# Patient Record
Sex: FEMALE | Race: WHITE | NOT HISPANIC OR LATINO
[De-identification: names, ages, dates, MRNs, and addresses within clinical notes are randomized per-mention and may not be internally consistent; named-entity substitution may affect disease eponyms.]

---

## 2021-01-29 ENCOUNTER — TRANSCRIPTION ENCOUNTER (OUTPATIENT)
Age: 61
End: 2021-01-29

## 2021-09-05 ENCOUNTER — TRANSCRIPTION ENCOUNTER (OUTPATIENT)
Age: 61
End: 2021-09-05

## 2023-08-17 ENCOUNTER — APPOINTMENT (OUTPATIENT)
Dept: ORTHOPEDIC SURGERY | Facility: CLINIC | Age: 63
End: 2023-08-17

## 2023-08-30 ENCOUNTER — APPOINTMENT (OUTPATIENT)
Dept: ORTHOPEDIC SURGERY | Facility: CLINIC | Age: 63
End: 2023-08-30
Payer: COMMERCIAL

## 2023-08-30 VITALS — WEIGHT: 240 LBS | BODY MASS INDEX: 34.36 KG/M2 | HEIGHT: 70 IN

## 2023-08-30 DIAGNOSIS — S93.492A SPRAIN OF OTHER LIGAMENT OF LEFT ANKLE, INITIAL ENCOUNTER: ICD-10-CM

## 2023-08-30 DIAGNOSIS — I10 ESSENTIAL (PRIMARY) HYPERTENSION: ICD-10-CM

## 2023-08-30 DIAGNOSIS — M25.671 STIFFNESS OF RIGHT ANKLE, NOT ELSEWHERE CLASSIFIED: ICD-10-CM

## 2023-08-30 DIAGNOSIS — Z00.00 ENCOUNTER FOR GENERAL ADULT MEDICAL EXAMINATION W/OUT ABNORMAL FINDINGS: ICD-10-CM

## 2023-08-30 PROCEDURE — 73610 X-RAY EXAM OF ANKLE: CPT | Mod: 50

## 2023-08-30 PROCEDURE — 99214 OFFICE O/P EST MOD 30 MIN: CPT

## 2023-08-30 RX ORDER — OLMESARTAN MEDOXOMIL / AMLODIPINE BESYLATE / HYDROCHLOROTHIAZIDE 10; 25; 40 MG/1; MG/1; MG/1
TABLET, FILM COATED ORAL
Refills: 0 | Status: ACTIVE | COMMUNITY

## 2023-08-30 RX ORDER — NEBIVOLOL HYDROCHLORIDE 20 MG/1
TABLET ORAL
Refills: 0 | Status: ACTIVE | COMMUNITY

## 2023-08-30 RX ORDER — MELOXICAM 15 MG/1
15 TABLET ORAL DAILY
Qty: 30 | Refills: 1 | Status: COMPLETED | COMMUNITY
Start: 2023-08-30 | End: 1900-01-01

## 2023-08-30 NOTE — PHYSICAL EXAM
[Left] : left foot and ankle [NL 30)] : inversion 30 degrees [NL (20)] : eversion 20 degrees [Right] : right foot and ankle [NL (40)] : plantar flexion 40 degrees [5___] : eversion 5[unfilled]/5 [2+] : posterior tibialis pulse: 2+ [Normal] : saphenous nerve sensation normal [Bilateral] : ankle bilaterally [Weight -] : weightbearing [4___] : inversion 4[unfilled]/5 [] : non-antalgic [FreeTextEntry3] : + pump bump [FreeTextEntry9] : Bilateral plantar heel and achilles insertion spurs.  [de-identified] : inversion 15 degrees [de-identified] : eversion 15 degrees [TWNoteComboBox7] : dorsiflexion 15 degrees

## 2023-08-30 NOTE — HISTORY OF PRESENT ILLNESS
[7] : 7 [3] : 3 [Burning] : burning [Dull/Aching] : dull/aching [Sharp] : sharp [de-identified] : Pt is a 63 year old female who presents today for evaluation of both ankles. Reports twisting injury LT ankle late July 2023. Evaluated at [facility] and diagnosed with ankle sprain. History of LT ankle sprains, partial tear Achilles tendon. LT ankle sprain has improved. RT ankle, developed pain and posterior bump early 2023. Pain worse with increased activity. No formal treatment to date. Symptoms on the RT have been intermittent and manageable. WB in sandals.  [] : no [FreeTextEntry1] : B/L Ankle

## 2023-08-30 NOTE — ASSESSMENT
[FreeTextEntry1] : LT ankle: The patient was explained that they have an ankle sprain. Weight bearing in supportive footwear with a neoprene sleeve was recommended.  Icing for 20 minutes 2-3 times per day was instructed. Physical therapy was prescribed, and home range of motion exercises were encouraged.   The patient was explained the options as well as benefits of over the counter verses prescription strength nonsteroidal anti-inflammatory medication. The patient opts for a prescription strength medication.  RT ankle: WBAT in supportive footwear, heel lifts. Recommend a course of PT as well. Ice to affected area. Stretching exercises recommended and demonstrated to patient.

## 2023-09-28 ENCOUNTER — APPOINTMENT (OUTPATIENT)
Dept: ORTHOPEDIC SURGERY | Facility: CLINIC | Age: 63
End: 2023-09-28

## 2023-10-10 ENCOUNTER — APPOINTMENT (OUTPATIENT)
Dept: ORTHOPEDIC SURGERY | Facility: CLINIC | Age: 63
End: 2023-10-10
Payer: COMMERCIAL

## 2023-10-10 ENCOUNTER — TRANSCRIPTION ENCOUNTER (OUTPATIENT)
Age: 63
End: 2023-10-10

## 2023-10-10 VITALS — WEIGHT: 240 LBS | HEIGHT: 70 IN | BODY MASS INDEX: 34.36 KG/M2

## 2023-10-10 DIAGNOSIS — M25.552 PAIN IN LEFT HIP: ICD-10-CM

## 2023-10-10 PROCEDURE — 99214 OFFICE O/P EST MOD 30 MIN: CPT

## 2023-10-10 PROCEDURE — 73503 X-RAY EXAM HIP UNI 4/> VIEWS: CPT | Mod: LT

## 2023-10-10 RX ORDER — MELOXICAM 15 MG/1
15 TABLET ORAL DAILY
Qty: 30 | Refills: 1 | Status: ACTIVE | COMMUNITY
Start: 2023-10-10 | End: 1900-01-01

## 2023-11-22 ENCOUNTER — APPOINTMENT (OUTPATIENT)
Dept: ORTHOPEDIC SURGERY | Facility: CLINIC | Age: 63
End: 2023-11-22
Payer: COMMERCIAL

## 2023-11-22 PROCEDURE — 99214 OFFICE O/P EST MOD 30 MIN: CPT

## 2023-11-28 ENCOUNTER — APPOINTMENT (OUTPATIENT)
Dept: ORTHOPEDIC SURGERY | Facility: CLINIC | Age: 63
End: 2023-11-28

## 2023-11-29 ENCOUNTER — APPOINTMENT (OUTPATIENT)
Dept: MRI IMAGING | Facility: CLINIC | Age: 63
End: 2023-11-29
Payer: COMMERCIAL

## 2023-11-29 PROCEDURE — 73721 MRI JNT OF LWR EXTRE W/O DYE: CPT | Mod: RT

## 2023-11-29 PROCEDURE — 73721 MRI JNT OF LWR EXTRE W/O DYE: CPT | Mod: 1L,RT

## 2023-12-07 ENCOUNTER — APPOINTMENT (OUTPATIENT)
Dept: ORTHOPEDIC SURGERY | Facility: CLINIC | Age: 63
End: 2023-12-07
Payer: COMMERCIAL

## 2023-12-07 PROCEDURE — 99214 OFFICE O/P EST MOD 30 MIN: CPT

## 2023-12-13 ENCOUNTER — APPOINTMENT (OUTPATIENT)
Dept: ORTHOPEDIC SURGERY | Facility: CLINIC | Age: 63
End: 2023-12-13
Payer: COMMERCIAL

## 2023-12-13 PROCEDURE — 99024 POSTOP FOLLOW-UP VISIT: CPT

## 2023-12-13 PROCEDURE — 005KIT: CUSTOM

## 2023-12-13 NOTE — PHYSICAL EXAM
[Right] : right foot and ankle [NL (40)] : plantar flexion 40 degrees [NL 30)] : inversion 30 degrees [NL (20)] : eversion 20 degrees [5___] : eversion 5[unfilled]/5 [2+] : posterior tibialis pulse: 2+ [Normal] : saphenous nerve sensation normal [] : non-antalgic [FreeTextEntry3] : pump bump present

## 2023-12-13 NOTE — DISCUSSION/SUMMARY
[de-identified] : With patient consent, 50 cc blood drawn from upper extremity using sterile technique. Pt. tolerated this well. 3 cc of PRP obtained and injected into right achilles tendon insertion using ultrasound guidance to localize needle placement. Pt. tolerated this well. They are recommended to be weight bearing as tolerated in CAM boot (can wean from boot in 2 weeks). No ice or NSAIDS permitted at this time. If any issues, patient encouraged to call office. F/U in 4-6 weeks.

## 2023-12-13 NOTE — HISTORY OF PRESENT ILLNESS
[7] : 7 [3] : 3 [Burning] : burning [Dull/Aching] : dull/aching [Sharp] : sharp [de-identified] : Patient returns for both ankles. Reports twisting injury LT ankle late July 2023. Evaluated at [facility] and diagnosed with ankle sprain. History of  a right partial tear Achilles tendon whch she had a PRP injection 8 years ago and a recent toradol injection in October, 2023. She reports no issues with the left ankle, but the right achilles tendon still bothers her. Pain is mostly when she squats down and when navigating stairs.  Right ankle MRI showed some distal achilles tendinosis.  She completed a course of PT with no real improvement. She presents today for PRP injection.  [] : no [FreeTextEntry1] : B/L Ankle

## 2024-01-18 ENCOUNTER — APPOINTMENT (OUTPATIENT)
Dept: ORTHOPEDIC SURGERY | Facility: CLINIC | Age: 64
End: 2024-01-18
Payer: COMMERCIAL

## 2024-01-18 DIAGNOSIS — M76.61 ACHILLES TENDINITIS, RIGHT LEG: ICD-10-CM

## 2024-01-18 PROCEDURE — 99213 OFFICE O/P EST LOW 20 MIN: CPT

## 2024-01-18 NOTE — PHYSICAL EXAM
[Right] : right foot and ankle [NL (40)] : plantar flexion 40 degrees [NL (20)] : eversion 20 degrees [5___] : eversion 5[unfilled]/5 [2+] : posterior tibialis pulse: 2+ [Normal] : saphenous nerve sensation normal [] : non-antalgic [FreeTextEntry3] : pump bump present [de-identified] : inversion 20 degrees

## 2024-01-18 NOTE — DISCUSSION/SUMMARY
[de-identified] : Patient seems to be doing much better. She will continue with home stretching. She should avoid high impact activities.

## 2024-01-18 NOTE — HISTORY OF PRESENT ILLNESS
[7] : 7 [3] : 3 [Burning] : burning [Dull/Aching] : dull/aching [Sharp] : sharp [de-identified] : Patient returns for he right ankle.  She had a PRP injection on 12/13/24 and reports some improvement, but still has some pain. She reports no issues with the left ankle.  Prior right ankle MRI showed some distal achilles tendinosis.   [] : no [FreeTextEntry1] : B/L Ankle

## 2024-02-02 ENCOUNTER — APPOINTMENT (OUTPATIENT)
Dept: MRI IMAGING | Facility: CLINIC | Age: 64
End: 2024-02-02
Payer: COMMERCIAL

## 2024-02-02 PROCEDURE — 73721 MRI JNT OF LWR EXTRE W/O DYE: CPT | Mod: LT

## 2024-02-19 ENCOUNTER — APPOINTMENT (OUTPATIENT)
Dept: ORTHOPEDIC SURGERY | Facility: CLINIC | Age: 64
End: 2024-02-19
Payer: COMMERCIAL

## 2024-02-19 VITALS — HEIGHT: 70 IN | BODY MASS INDEX: 34.36 KG/M2 | WEIGHT: 240 LBS

## 2024-02-19 PROCEDURE — 76942 ECHO GUIDE FOR BIOPSY: CPT | Mod: 59,LT

## 2024-02-19 PROCEDURE — J3490M: CUSTOM

## 2024-02-19 PROCEDURE — 20551 NJX 1 TENDON ORIGIN/INSJ: CPT | Mod: LT

## 2024-02-19 PROCEDURE — 99214 OFFICE O/P EST MOD 30 MIN: CPT | Mod: 25

## 2024-02-19 NOTE — HISTORY OF PRESENT ILLNESS
[Sudden] : sudden [Constant] : constant [Leisure] : leisure [Meds] : meds [de-identified] : 2/19/24: Here to review LT hip MRI.  Doing PT with sig benefit but still having constant lateral pain that radiates to groin. Not taking meds for pain. No cane.   8/05/23: LT hip pain x 2 months since accident while trying to get out of pool and slipped. Initially her ankle was causing the most pain- but now that it has resolved the hip is bothering her more. Most pain on lateral aspect and radiates to groin. Painful to sleep on left side, walking not so bad. Pain with rotation. Was taking Meloxicam for the ankle but states it was helping the hip. Ambulates without assitance.  [] : no [FreeTextEntry1] : Lt Hip  [FreeTextEntry5] : Patient is here for LT hip pain. Patient slipped on 08/05/23 trying to get out the pool. [place] her leg twist. Shahzad's n/t. [FreeTextEntry9] : Meloxciam

## 2024-02-19 NOTE — ASSESSMENT
[FreeTextEntry1] : 10/10/23: Pt with traumatic bursitis of LT hip. Discussed conservative tx options- risks and benefits explained. Pt is well informed and would like to proceed with PT (in addition to her ankle PT)  and start on Meloxicam. 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 Some improvement with PT but still having some sig lateral and groin pain. She is well informed and would like to proceed with LT hip injection today. If no benefit discussed possibility of IA injection. Follow up 1 month

## 2024-02-19 NOTE — DISCUSSION/SUMMARY
[de-identified] : The patient was advised of the diagnosis.  The natural history of the pathology was explained in full to the patient in layman's terms. All questions were answered.  The risks and benefits of surgical and non-surgical treatment alternatives were explained in full to the patient.  Entered by Sissy Chen acting as a scribe.

## 2024-02-19 NOTE — DATA REVIEWED
[MRI] : MRI [Left] : left [Hip] : hip [I reviewed the films/CD] : I reviewed the films/CD [FreeTextEntry1] : MRI LT HIP showing moderate to severe tendinopathy and some partial tearing of glute medius with mild OA

## 2024-02-19 NOTE — IMAGING
[de-identified] : LEFT HIP IR and ER gives lateral pain no groin pain (-) impingement no pain with resisted SLR pain with resisted abduction greater troch tenderness 5/5 strength NVI +2 pt pulses (-) Trendelenburg  LSPINE no lumbar pain  XR LT HIP showing calcification at bursa on lateral troch- otherwise normal.

## 2024-03-18 ENCOUNTER — APPOINTMENT (OUTPATIENT)
Dept: ORTHOPEDIC SURGERY | Facility: CLINIC | Age: 64
End: 2024-03-18
Payer: COMMERCIAL

## 2024-03-18 VITALS — HEIGHT: 70 IN | WEIGHT: 240 LBS | BODY MASS INDEX: 34.36 KG/M2

## 2024-03-18 DIAGNOSIS — M16.12 UNILATERAL PRIMARY OSTEOARTHRITIS, LEFT HIP: ICD-10-CM

## 2024-03-18 DIAGNOSIS — S76.202A UNSPECIFIED INJURY OF ADDUCTOR MUSCLE, FASCIA AND TENDON OF LEFT THIGH, INITIAL ENCOUNTER: ICD-10-CM

## 2024-03-18 DIAGNOSIS — M70.62 TROCHANTERIC BURSITIS, LEFT HIP: ICD-10-CM

## 2024-03-18 PROCEDURE — 99212 OFFICE O/P EST SF 10 MIN: CPT

## 2024-03-18 NOTE — HISTORY OF PRESENT ILLNESS
[0] : 0 [Rest] : rest [de-identified] : 3/18/24: Pt with sig damage to abductor with partial tearing and mild OA. Feels about 80% relief with injection- still some difficulty putting on socks and shoes but otherwise min to no pain. Has not done PT yet- states she has been in [place]. Has not been taking meds for pain since inj  8/05/23: LT hip pain x 2 months since accident while trying to get out of pool and slipped. Initially her ankle was causing the most pain- but now that it has resolved the hip is bothering her more. Most pain on lateral aspect and radiates to groin. Painful to sleep on left side, walking not so bad. Pain with rotation. Was taking Meloxicam for the ankle but states it was helping the hip. Ambulates without assitance.  2/19/24: Here to review LT hip MRI.  Doing PT with sig benefit but still having constant lateral pain that radiates to groin. Not taking meds for pain. No cane.  [] : no [FreeTextEntry1] : left hip  [FreeTextEntry5] : was given csi 02/19/24

## 2024-03-18 NOTE — DISCUSSION/SUMMARY
[de-identified] : The patient was advised of the diagnosis.  The natural history of the pathology was explained in full to the patient in layman's terms. All questions were answered.  The risks and benefits of surgical and non-surgical treatment alternatives were explained in full to the patient.  Entered by Sissy Chen acting as a scribe.

## 2024-03-18 NOTE — ASSESSMENT
[FreeTextEntry1] : 10/10/23: Pt with traumatic bursitis of LT hip. Discussed conservative tx options- risks and benefits explained. Pt is well informed and would like to proceed with PT (in addition to her ankle PT)  and start on Meloxicam. 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 Some improvement with PT but still having some sig lateral and groin pain. She is well informed and would like to proceed with LT hip injection today. If no benefit discussed possibility of IA injection. Follow up 1 month  3/18/24: Sig improvement with bursa injection at last visit. Discussed we cannot inject this site repeatedly due to risk of tendon rupture- but she is feeling significantly better at this time. Unsure if she wants to do PT as she is also dealing with an ankle injury. She will speak to her PT and follow up with me as needed.

## 2024-03-18 NOTE — IMAGING
[de-identified] : LEFT HIP IR and ER gives lateral pain no groin pain (-) impingement no pain with resisted SLR pain with resisted abduction greater troch tenderness 5/5 strength NVI +2 pt pulses (-) Trendelenburg  LSPINE no lumbar pain  XR LT HIP showing calcification at bursa on lateral troch- otherwise normal.

## 2024-03-28 ENCOUNTER — APPOINTMENT (OUTPATIENT)
Dept: ORTHOPEDIC SURGERY | Facility: CLINIC | Age: 64
End: 2024-03-28

## 2024-05-31 ENCOUNTER — APPOINTMENT (OUTPATIENT)
Dept: OBGYN | Facility: CLINIC | Age: 64
End: 2024-05-31

## 2024-06-07 ENCOUNTER — APPOINTMENT (OUTPATIENT)
Dept: OPHTHALMOLOGY | Facility: CLINIC | Age: 64
End: 2024-06-07
Payer: COMMERCIAL

## 2024-06-07 ENCOUNTER — NON-APPOINTMENT (OUTPATIENT)
Age: 64
End: 2024-06-07

## 2024-06-07 PROCEDURE — 92133 CPTRZD OPH DX IMG PST SGM ON: CPT

## 2024-06-07 PROCEDURE — 92004 COMPRE OPH EXAM NEW PT 1/>: CPT

## 2024-07-09 ENCOUNTER — APPOINTMENT (OUTPATIENT)
Dept: OBGYN | Facility: CLINIC | Age: 64
End: 2024-07-09
Payer: COMMERCIAL

## 2024-07-09 VITALS
DIASTOLIC BLOOD PRESSURE: 89 MMHG | WEIGHT: 242.56 LBS | BODY MASS INDEX: 34.72 KG/M2 | HEIGHT: 70 IN | SYSTOLIC BLOOD PRESSURE: 170 MMHG

## 2024-07-09 DIAGNOSIS — Z01.419 ENCOUNTER FOR GYNECOLOGICAL EXAMINATION (GENERAL) (ROUTINE) W/OUT ABNORMAL FINDINGS: ICD-10-CM

## 2024-07-09 LAB
DATE COLLECTED: NORMAL
HEMOCCULT SP1 STL QL: NEGATIVE
QUALITY CONTROL: YES

## 2024-07-09 PROCEDURE — 82270 OCCULT BLOOD FECES: CPT

## 2024-07-09 PROCEDURE — 99387 INIT PM E/M NEW PAT 65+ YRS: CPT

## 2024-07-10 LAB — HPV HIGH+LOW RISK DNA PNL CVX: NOT DETECTED

## 2024-07-15 LAB — CYTOLOGY CVX/VAG DOC THIN PREP: NORMAL

## 2025-06-03 ENCOUNTER — NON-APPOINTMENT (OUTPATIENT)
Age: 65
End: 2025-06-03

## 2025-06-05 ENCOUNTER — NON-APPOINTMENT (OUTPATIENT)
Age: 65
End: 2025-06-05

## 2025-06-06 ENCOUNTER — APPOINTMENT (OUTPATIENT)
Dept: OPHTHALMOLOGY | Facility: CLINIC | Age: 65
End: 2025-06-06
Payer: COMMERCIAL

## 2025-06-06 ENCOUNTER — APPOINTMENT (OUTPATIENT)
Dept: OPHTHALMOLOGY | Facility: CLINIC | Age: 65
End: 2025-06-06

## 2025-06-06 ENCOUNTER — NON-APPOINTMENT (OUTPATIENT)
Age: 65
End: 2025-06-06

## 2025-06-06 PROCEDURE — 92014 COMPRE OPH EXAM EST PT 1/>: CPT

## 2025-06-06 PROCEDURE — 92015 DETERMINE REFRACTIVE STATE: CPT

## 2025-06-16 ENCOUNTER — NON-APPOINTMENT (OUTPATIENT)
Age: 65
End: 2025-06-16

## 2025-06-16 ENCOUNTER — APPOINTMENT (OUTPATIENT)
Dept: OPHTHALMOLOGY | Facility: CLINIC | Age: 65
End: 2025-06-16
Payer: COMMERCIAL

## 2025-06-16 PROCEDURE — 92136 OPHTHALMIC BIOMETRY: CPT

## 2025-06-16 PROCEDURE — 99213 OFFICE O/P EST LOW 20 MIN: CPT

## 2025-06-24 ENCOUNTER — APPOINTMENT (OUTPATIENT)
Dept: OPHTHALMOLOGY | Facility: AMBULATORY SURGERY CENTER | Age: 65
End: 2025-06-24
Payer: COMMERCIAL

## 2025-06-24 ENCOUNTER — NON-APPOINTMENT (OUTPATIENT)
Age: 65
End: 2025-06-24

## 2025-06-24 PROCEDURE — 66984 XCAPSL CTRC RMVL W/O ECP: CPT | Mod: RT

## 2025-06-24 PROCEDURE — CL050: CPT

## 2025-06-25 ENCOUNTER — APPOINTMENT (OUTPATIENT)
Dept: OPHTHALMOLOGY | Facility: CLINIC | Age: 65
End: 2025-06-25
Payer: COMMERCIAL

## 2025-06-25 ENCOUNTER — NON-APPOINTMENT (OUTPATIENT)
Age: 65
End: 2025-06-25

## 2025-06-25 PROCEDURE — 92136 OPHTHALMIC BIOMETRY: CPT | Mod: 26,LT

## 2025-06-25 PROCEDURE — 99213 OFFICE O/P EST LOW 20 MIN: CPT | Mod: 24

## 2025-07-01 ENCOUNTER — APPOINTMENT (OUTPATIENT)
Dept: OPHTHALMOLOGY | Facility: AMBULATORY SURGERY CENTER | Age: 65
End: 2025-07-01
Payer: COMMERCIAL

## 2025-07-01 ENCOUNTER — NON-APPOINTMENT (OUTPATIENT)
Age: 65
End: 2025-07-01

## 2025-07-01 PROCEDURE — 66984 XCAPSL CTRC RMVL W/O ECP: CPT | Mod: 79,LT

## 2025-07-01 PROCEDURE — CL050: CPT

## 2025-07-02 ENCOUNTER — NON-APPOINTMENT (OUTPATIENT)
Age: 65
End: 2025-07-02

## 2025-07-02 ENCOUNTER — APPOINTMENT (OUTPATIENT)
Dept: OPHTHALMOLOGY | Facility: CLINIC | Age: 65
End: 2025-07-02
Payer: COMMERCIAL

## 2025-07-02 PROCEDURE — 99024 POSTOP FOLLOW-UP VISIT: CPT

## 2025-07-08 ENCOUNTER — APPOINTMENT (OUTPATIENT)
Dept: OPHTHALMOLOGY | Facility: CLINIC | Age: 65
End: 2025-07-08
Payer: COMMERCIAL

## 2025-07-08 ENCOUNTER — NON-APPOINTMENT (OUTPATIENT)
Age: 65
End: 2025-07-08

## 2025-07-08 PROCEDURE — 99024 POSTOP FOLLOW-UP VISIT: CPT

## 2025-07-14 PROBLEM — Z12.39 BREAST CANCER SCREENING: Status: ACTIVE | Noted: 2025-07-14

## 2025-07-15 ENCOUNTER — APPOINTMENT (OUTPATIENT)
Dept: OBGYN | Facility: CLINIC | Age: 65
End: 2025-07-15
Payer: COMMERCIAL

## 2025-07-15 VITALS
SYSTOLIC BLOOD PRESSURE: 130 MMHG | BODY MASS INDEX: 35.65 KG/M2 | WEIGHT: 249 LBS | HEIGHT: 70 IN | DIASTOLIC BLOOD PRESSURE: 84 MMHG

## 2025-07-15 PROBLEM — Z01.419 ENCOUNTER FOR ANNUAL ROUTINE GYNECOLOGICAL EXAMINATION: Status: ACTIVE | Noted: 2025-07-15

## 2025-07-15 PROBLEM — L90.0 LICHEN SCLEROSUS: Status: ACTIVE | Noted: 2025-07-15

## 2025-07-15 PROBLEM — Z01.419 ENCOUNTER FOR ANNUAL ROUTINE GYNECOLOGICAL EXAMINATION: Status: RESOLVED | Noted: 2024-07-09 | Resolved: 2025-07-15

## 2025-07-15 PROBLEM — Z80.3 FAMILY HISTORY OF BREAST CANCER: Status: ACTIVE | Noted: 2025-07-15

## 2025-07-15 LAB
CARD LOT #: NORMAL
CARD LOT EXP DATE: NORMAL
DATE COLLECTED: NORMAL
DATE COLLECTED: NORMAL
DEVELOPER LOT #: NORMAL
DEVELOPER LOT EXP DATE: NORMAL
HEMOCCULT 2: NEGATIVE
HEMOCCULT SP1 STL QL: NEGATIVE
QUALITY CONTROL: YES
QUALITY CONTROL: YES

## 2025-07-15 PROCEDURE — 82270 OCCULT BLOOD FECES: CPT

## 2025-07-15 PROCEDURE — 99397 PER PM REEVAL EST PAT 65+ YR: CPT

## 2025-07-15 RX ORDER — CLOBETASOL PROPIONATE 0.5 MG/G
0.05 CREAM TOPICAL TWICE DAILY
Qty: 1 | Refills: 4 | Status: ACTIVE | COMMUNITY
Start: 2025-07-15 | End: 1900-01-01

## 2025-07-17 LAB — CYTOLOGY CVX/VAG DOC THIN PREP: NORMAL

## 2025-07-18 ENCOUNTER — NON-APPOINTMENT (OUTPATIENT)
Age: 65
End: 2025-07-18

## 2025-07-18 ENCOUNTER — APPOINTMENT (OUTPATIENT)
Dept: OPHTHALMOLOGY | Facility: CLINIC | Age: 65
End: 2025-07-18
Payer: COMMERCIAL

## 2025-07-18 PROCEDURE — 99024 POSTOP FOLLOW-UP VISIT: CPT

## 2025-07-22 ENCOUNTER — APPOINTMENT (OUTPATIENT)
Dept: OPHTHALMOLOGY | Facility: CLINIC | Age: 65
End: 2025-07-22
Payer: COMMERCIAL

## 2025-07-22 ENCOUNTER — NON-APPOINTMENT (OUTPATIENT)
Age: 65
End: 2025-07-22

## 2025-07-22 PROCEDURE — 99024 POSTOP FOLLOW-UP VISIT: CPT

## 2025-07-25 ENCOUNTER — APPOINTMENT (OUTPATIENT)
Dept: OPHTHALMOLOGY | Facility: CLINIC | Age: 65
End: 2025-07-25
Payer: COMMERCIAL

## 2025-07-25 ENCOUNTER — NON-APPOINTMENT (OUTPATIENT)
Age: 65
End: 2025-07-25

## 2025-07-25 PROCEDURE — 99024 POSTOP FOLLOW-UP VISIT: CPT

## 2025-08-12 ENCOUNTER — APPOINTMENT (OUTPATIENT)
Dept: OPHTHALMOLOGY | Facility: CLINIC | Age: 65
End: 2025-08-12

## 2025-08-19 ENCOUNTER — APPOINTMENT (OUTPATIENT)
Dept: OPHTHALMOLOGY | Facility: CLINIC | Age: 65
End: 2025-08-19

## 2025-08-26 ENCOUNTER — APPOINTMENT (OUTPATIENT)
Dept: OPHTHALMOLOGY | Facility: CLINIC | Age: 65
End: 2025-08-26

## 2025-09-02 ENCOUNTER — APPOINTMENT (OUTPATIENT)
Dept: OPHTHALMOLOGY | Facility: CLINIC | Age: 65
End: 2025-09-02

## 2025-09-16 DIAGNOSIS — N63.0 UNSPECIFIED LUMP IN UNSPECIFIED BREAST: ICD-10-CM
